# Patient Record
Sex: FEMALE | Race: WHITE | URBAN - METROPOLITAN AREA
[De-identification: names, ages, dates, MRNs, and addresses within clinical notes are randomized per-mention and may not be internally consistent; named-entity substitution may affect disease eponyms.]

---

## 2022-09-15 ENCOUNTER — OFFICE VISIT (OUTPATIENT)
Dept: URBAN - METROPOLITAN AREA CLINIC 19 | Facility: CLINIC | Age: 52
End: 2022-09-15

## 2022-09-15 NOTE — PHYSICAL EXAM GASTROINTESTINAL
Abdomen , soft, nontender, nondistended , no guarding or rigidity , normal bowel sounds , no hepatomegaly present

## 2022-09-15 NOTE — HPI-TODAY'S VISIT:
Yakelin is a 52-year-old female who presents on referral from Keyona Carmona NP for colon cancer screening.   Admits/Denies history of colon polyps/cancer. Admits/Denies family history of colon polyps/cancer.  Denies abdominal pain, diarrhea, constipation, blood in stools, change in bowel habits  Last Colonoscopy was:  Cardiac History -is taking blood thinners -Sees   Denies previous issues with anesthesia

## 2022-09-15 NOTE — PHYSICAL EXAM HENT:
Head , normocephalic , atraumatic , Face within normal limits , External ears within normal limits , External nose  normal appearance 98.3

## 2022-10-25 ENCOUNTER — DASHBOARD ENCOUNTERS (OUTPATIENT)
Age: 52
End: 2022-10-25

## 2022-10-27 ENCOUNTER — WEB ENCOUNTER (OUTPATIENT)
Dept: URBAN - METROPOLITAN AREA CLINIC 19 | Facility: CLINIC | Age: 52
End: 2022-10-27

## 2022-10-27 ENCOUNTER — OFFICE VISIT (OUTPATIENT)
Dept: URBAN - METROPOLITAN AREA CLINIC 19 | Facility: CLINIC | Age: 52
End: 2022-10-27
Payer: COMMERCIAL

## 2022-10-27 VITALS
DIASTOLIC BLOOD PRESSURE: 68 MMHG | SYSTOLIC BLOOD PRESSURE: 122 MMHG | WEIGHT: 157 LBS | HEART RATE: 89 BPM | TEMPERATURE: 98.3 F | HEIGHT: 67 IN | BODY MASS INDEX: 24.64 KG/M2

## 2022-10-27 DIAGNOSIS — Z12.11 SCREENING FOR MALIGNANT NEOPLASM OF COLON: ICD-10-CM

## 2022-10-27 DIAGNOSIS — Z85.820 HISTORY OF MELANOMA: ICD-10-CM

## 2022-10-27 PROCEDURE — 99203 OFFICE O/P NEW LOW 30 MIN: CPT | Performed by: STUDENT IN AN ORGANIZED HEALTH CARE EDUCATION/TRAINING PROGRAM

## 2022-10-27 NOTE — EXAM-FUNCTIONAL ASSESSMENT
Patient wearing mask due to COVID-19  General--no acute distress, resting comfortably Eyes--anicteric, no pallor HENT--normocephalic, atraumatic head Neck--no lymphadenopathy, non tender Chest--normal breath sounds, equal rise Heart--regular rate and rhythm Abdomen--soft, non tender, non distended, bowel sounds present, no hepatomegaly Rectal exam deferred at this time.  Rectal exam:  Chaperone present.

## 2022-10-27 NOTE — HPI-TODAY'S VISIT:
Pt is a  53 yo F who presents for evaluation for colon cancer screening.  Symptoms:  Pt denies abdominal pain, change in stool habits, change in stool caliber, unintentional weight loss, hematochezia or other new symptoms in the last 6-12 months.  OAC/APT use:  Denies exposure to any OAC/APT.  Substance use history:  Former smoker who quit 1 year   ago.  Social use of alcohol.  Denies use of OTC or herbal supplements or any recreational drugs.  FH:  No known h/o CRCs, colon polyps in family members.          Abdominal surgeries:  None. Last C-scope: None

## 2022-10-29 PROBLEM — 161432005: Status: ACTIVE | Noted: 2022-10-27

## 2022-10-29 PROBLEM — 305058001: Status: ACTIVE | Noted: 2022-10-27

## 2022-12-13 ENCOUNTER — WEB ENCOUNTER (OUTPATIENT)
Dept: URBAN - METROPOLITAN AREA SURGERY CENTER 31 | Facility: SURGERY CENTER | Age: 52
End: 2022-12-13

## 2022-12-16 ENCOUNTER — CLAIMS CREATED FROM THE CLAIM WINDOW (OUTPATIENT)
Dept: URBAN - METROPOLITAN AREA SURGERY CENTER 31 | Facility: SURGERY CENTER | Age: 52
End: 2022-12-16

## 2022-12-16 ENCOUNTER — CLAIMS CREATED FROM THE CLAIM WINDOW (OUTPATIENT)
Dept: URBAN - METROPOLITAN AREA SURGERY CENTER 31 | Facility: SURGERY CENTER | Age: 52
End: 2022-12-16
Payer: COMMERCIAL

## 2022-12-16 DIAGNOSIS — Z12.11 COLON CANCER SCREENING: ICD-10-CM

## 2022-12-16 PROCEDURE — 45378 DIAGNOSTIC COLONOSCOPY: CPT | Performed by: STUDENT IN AN ORGANIZED HEALTH CARE EDUCATION/TRAINING PROGRAM

## 2022-12-16 PROCEDURE — G8907 PT DOC NO EVENTS ON DISCHARG: HCPCS | Performed by: STUDENT IN AN ORGANIZED HEALTH CARE EDUCATION/TRAINING PROGRAM
